# Patient Record
Sex: MALE | Race: WHITE
[De-identification: names, ages, dates, MRNs, and addresses within clinical notes are randomized per-mention and may not be internally consistent; named-entity substitution may affect disease eponyms.]

---

## 2019-08-19 ENCOUNTER — HOSPITAL ENCOUNTER (OUTPATIENT)
Dept: HOSPITAL 88 - OR | Age: 54
Setting detail: OBSERVATION
LOS: 1 days | Discharge: HOME | End: 2019-08-20
Attending: SPECIALIST | Admitting: SPECIALIST
Payer: COMMERCIAL

## 2019-08-19 VITALS — HEIGHT: 65 IN | WEIGHT: 227.25 LBS | BODY MASS INDEX: 37.86 KG/M2

## 2019-08-19 VITALS — DIASTOLIC BLOOD PRESSURE: 58 MMHG | SYSTOLIC BLOOD PRESSURE: 109 MMHG

## 2019-08-19 VITALS — DIASTOLIC BLOOD PRESSURE: 62 MMHG | SYSTOLIC BLOOD PRESSURE: 131 MMHG

## 2019-08-19 VITALS — SYSTOLIC BLOOD PRESSURE: 131 MMHG | DIASTOLIC BLOOD PRESSURE: 62 MMHG

## 2019-08-19 VITALS — SYSTOLIC BLOOD PRESSURE: 97 MMHG | DIASTOLIC BLOOD PRESSURE: 52 MMHG

## 2019-08-19 VITALS — DIASTOLIC BLOOD PRESSURE: 59 MMHG | SYSTOLIC BLOOD PRESSURE: 115 MMHG

## 2019-08-19 DIAGNOSIS — E78.5: ICD-10-CM

## 2019-08-19 DIAGNOSIS — M17.12: Primary | ICD-10-CM

## 2019-08-19 DIAGNOSIS — F17.200: ICD-10-CM

## 2019-08-19 DIAGNOSIS — D64.9: ICD-10-CM

## 2019-08-19 PROCEDURE — 97161 PT EVAL LOW COMPLEX 20 MIN: CPT

## 2019-08-19 PROCEDURE — 85014 HEMATOCRIT: CPT

## 2019-08-19 PROCEDURE — 36415 COLL VENOUS BLD VENIPUNCTURE: CPT

## 2019-08-19 PROCEDURE — 93005 ELECTROCARDIOGRAM TRACING: CPT

## 2019-08-19 PROCEDURE — 97116 GAIT TRAINING THERAPY: CPT

## 2019-08-19 PROCEDURE — 97530 THERAPEUTIC ACTIVITIES: CPT

## 2019-08-19 PROCEDURE — 27447 TOTAL KNEE ARTHROPLASTY: CPT

## 2019-08-19 PROCEDURE — 86850 RBC ANTIBODY SCREEN: CPT

## 2019-08-19 PROCEDURE — 73560 X-RAY EXAM OF KNEE 1 OR 2: CPT

## 2019-08-19 PROCEDURE — 86900 BLOOD TYPING SEROLOGIC ABO: CPT

## 2019-08-19 PROCEDURE — 85018 HEMOGLOBIN: CPT

## 2019-08-19 PROCEDURE — 86920 COMPATIBILITY TEST SPIN: CPT

## 2019-08-19 RX ADMIN — Medication SCH MG: at 17:23

## 2019-08-19 RX ADMIN — CEFAZOLIN SODIUM SCH MLS/HR: 1 SOLUTION INTRAVENOUS at 14:25

## 2019-08-19 RX ADMIN — SODIUM CHLORIDE SCH MLS/HR: 9 INJECTION, SOLUTION INTRAVENOUS at 14:00

## 2019-08-19 RX ADMIN — ACETAMINOPHEN SCH MG: 10 INJECTION, SOLUTION INTRAVENOUS at 12:00

## 2019-08-19 RX ADMIN — CEFAZOLIN SODIUM SCH MLS/HR: 1 SOLUTION INTRAVENOUS at 22:30

## 2019-08-19 RX ADMIN — ACETAMINOPHEN SCH MG: 10 INJECTION, SOLUTION INTRAVENOUS at 17:23

## 2019-08-19 NOTE — OPERATIVE REPORT
DATE OF PROCEDURE:  08/19/2019

 

SURGEON:  Alejandro Patricia MD

 

ASSISTANT:  Luis Fernando Jeter.

 

PREOPERATIVE DIAGNOSIS:  Osteoarthritis, left knee.

 

POSTOPERATIVE DIAGNOSIS:  Osteoarthritis, left knee.

 

PROCEDURE:  Left total knee arthroplasty.

 

INDICATIONS:  The patient is a 53-year-old gentleman, who complains of severe pain in

his left knee.  Clinic exam and x-rays are consistent with medial compartment

osteoarthritis.  The findings and options have been discussed.  The patient is highly

motivated to proceed with a left total knee replacement.  The risks and benefits have

been reviewed.  Alternatives of care have been discussed.  He states he understands and

wishes to proceed. 

 

PROCEDURE IN DETAIL:  The patient was brought to the operating room and placed under

general anesthetic.  His left lower extremity was prepped and draped in a sterile

manner.  He received prophylactic antibiotics, tranexamic acid, and a regional block in

the holding area.  A preoperative time-out was performed.  The extremity was

exsanguinated and a proximal tourniquet was inflated to 300 mmHg.  An anterior incision

with a medial parapatellar arthrotomy was performed.  Clear synovial fluid was removed

from the joint.  Soft tissue releases were performed to bring the knee up into flexion

with the patella everted.  The anterior cruciate ligament was sacrificed.  A [employer] knee system was used throughout the case.  Marginal osteophytes and

meniscal remnants were removed.  An extramedullary cutting guide was used to resect the

proximal tibia.  The tibial base plate was noted to be a size D.  The central fin punch

was drilled and impacted.  Attention was directed towards the distal femur.  An

intramedullary cutting guide was used to resect the distal femur in 5 degrees of valgus

and rotation referencing off a combination of landmarks including Whitesides line, the

epicondylar axis, and the posterior condyles.  The femoral component was a size #7.  The

anterior and posterior cuts were made.  Trial reductions were performed.  A 10 mm medial

congruent tibial insert provided appropriate soft tissue balancing in full extension and

90 degrees of flexion.  The patella was resurfaced with a 32 mm x 8.5 mm patellar

button.  The thickness was checked before and after and was right around 22 mm.

Patellar tracking was noted to be concentric.  The trial implants were then all removed.

 A 100 mL premixed pericapsular WARREN injection was placed into the surrounding soft

tissue.  The knee was then thoroughly irrigated with a shower tip pulsatile lavage.  All

bone cuts using an oscillating saw had been irrigated using a spray mixture of diluted

polymyxin and vancomycin spray.  The components were cemented into place using a single

mix of Palacos cement preloaded with antibiotics.  Care was taken to remove extravasated

cement.  The wound was further irrigated with the pulsatile lavage while the cement

cured.  A 500 mg of vancomycin powder was then placed into the joint.  The arthrotomy

was closed with interrupted #1 Ethibond.  The skin was closed with subcuticular Vicryl

and staples.  A sterile Aquacel bandage and an Ace wrap were applied.  The patient was 

then transported to the recovery room in stable condition.  Blood loss was minimal.  All

needle and sponge counts were correct. 

 

 

 

 

______________________________

Alejandro Patricia MD DR/VAL

D:  08/19/2019 10:57:11

T:  08/19/2019 12:47:34

Job #:  [id number]

## 2019-08-19 NOTE — DIAGNOSTIC IMAGING REPORT
Left knee, 2 views.





History: Postop.



Findings: Status post total left knee arthroplasty with prosthetic components

in anatomic alignment.  Overlying subcutaneous emphysema and surgical skin

staples are present.





IMPRESSION:

Status post left knee replacement in anatomic position.



Signed by: Daniel Delvalle on 8/19/2019 10:40 AM

## 2019-08-19 NOTE — NUR
RECEIVED PATIENT FROM RECOVERY. PATIENT A/O X3, EVEN RESPIRATIONS ON RA. BOWEL SOUNDS X4. 
LUNG SOUNDS CLEAR TO AUSCULTATION. LEFT HAND 20 GAUGE IV WITH NS @ 100 CC/HR. RIGHT TAMMIE HOSE 
IN PLACE. FOOT PUMPS BILATERALLY. NO PAIN AT THIS TIME. LEFT KNEE DRESSING CLEAN, DRY, AND 
INTACT. VITAL SIGNS STABLE. CALL LIGHT IN REACH WILL CONTINUE TO MONITOR PATIENT. none

## 2019-08-20 VITALS — SYSTOLIC BLOOD PRESSURE: 127 MMHG | DIASTOLIC BLOOD PRESSURE: 63 MMHG

## 2019-08-20 VITALS — DIASTOLIC BLOOD PRESSURE: 64 MMHG | SYSTOLIC BLOOD PRESSURE: 112 MMHG

## 2019-08-20 VITALS — DIASTOLIC BLOOD PRESSURE: 63 MMHG | SYSTOLIC BLOOD PRESSURE: 127 MMHG

## 2019-08-20 VITALS — SYSTOLIC BLOOD PRESSURE: 122 MMHG | DIASTOLIC BLOOD PRESSURE: 70 MMHG

## 2019-08-20 LAB
HCT VFR BLD AUTO: 39 % (ref 38.2–49.6)
HGB BLD-MCNC: 13.5 G/DL (ref 14–18)

## 2019-08-20 RX ADMIN — SODIUM CHLORIDE SCH MLS/HR: 9 INJECTION, SOLUTION INTRAVENOUS at 00:00

## 2019-08-20 RX ADMIN — ACETAMINOPHEN SCH MG: 10 INJECTION, SOLUTION INTRAVENOUS at 06:03

## 2019-08-20 RX ADMIN — ACETAMINOPHEN SCH MG: 10 INJECTION, SOLUTION INTRAVENOUS at 00:30

## 2019-08-20 RX ADMIN — CEFAZOLIN SODIUM SCH MLS/HR: 1 SOLUTION INTRAVENOUS at 06:18

## 2019-08-20 RX ADMIN — SODIUM CHLORIDE SCH MLS/HR: 9 INJECTION, SOLUTION INTRAVENOUS at 10:00

## 2019-08-20 RX ADMIN — Medication SCH MG: at 08:06

## 2019-08-20 NOTE — CONSULTATION
DATE OF CONSULTATION:    

 

REASON FOR CONSULTATION:  Postop medical management.

 

HISTORY OF PRESENT ILLNESS:  The patient is a 53-year-old gentleman, status post left

total knee arthroplasty for end-stage osteoarthritis, who is doing very well

postoperatively with minimal pain in the left knee and denies any chest pain, fever,

chills, headaches, shortness of breath, dizziness, nausea, vomiting, or fever chills on

review of systems. 

 

PAST MEDICAL HISTORY:  Significant for osteoarthritis.

 

PAST SURGICAL HISTORY:  Knee scope.

 

MEDICATIONS:  Anti-inflammatory.

 

ALLERGIES:  NONE.

 

SOCIAL HISTORY:  Non-.  Works as a .  He does smoke about one pack per

day for 37 years.  Nondrinker. 

 

FAMILY HISTORY:  Noncontributory.

 

PHYSICAL EXAMINATION:

VITAL SIGNS:  Temperature is 96, pulse 76, blood pressure 136/70, and sats 98% on room

air. 

GENERAL:  He is no apparent distress, lying in bed. 

NECK:  Supple. 

CARDIOVASCULAR:  Regular rate rhythm. 

LUNGS:  Clear to auscultation bilaterally. 

ABDOMEN:  Good bowel sounds.  Soft and nontender. 

EXTREMITIES:  No clubbing or cyanosis.  Left knee is bandaged with no seepage. 

NEUROLOGIC:  Nonfocal.

ASSESSMENT AND PLAN:  

1. Status post left knee arthroplasty.  Continue with postoperative care.  Left knee

pain, continue the current pain medicines. 

2. Anemia, check a CBC.

3. Tobacco abuse.  The patient was instructed to discontinue smoking.  Please see

hospital chart for full details. 

 

 

 

 

______________________________

MD VESTA Cruz/VAL

D:  08/20/2019 04:34:24

T:  08/20/2019 06:00:53

Job #:  [id number]

## 2019-08-20 NOTE — NUR
PATIENT A/O X3, EVEN RESPIRATIONS ON RA. LUNG SOUNDS CLEAR TO AUSCULTATION. BOWEL SOUNDS X4. 
LEFT KNEE DRESSING CLEAN, DRY, AND INTACT. TAMMIE HOSE AND FOOT PUMPS BILATERALLY. LEFT HAND 20 
GAUGE IV SL. PAIN 5/10 LEFT KNEE. PRN PAIN MEDICATION. VITAL SIGNS STABLE, CALL LIGHT IN 
REACH WILL CONTINUE TO MONITOR PATIENT.

## 2019-08-20 NOTE — NUR
PATIENT DISCHARGED FROM FACILITY. PATIENT GATHERED ALL PERSONAL BELONGINGS, DISCHARGE 
INSTRUCTIONS, AND FOLLOW UP INFORMATION. LEFT UNIT IN WHEEL CHAIR AND WENT HOME VIA PRIVATE 
AUTO. NO SIGNS OF DISTRESS WHEN LEAVING FACILITY.

## 2019-08-20 NOTE — NUR
RECEIVED PATIENT AWAKE RESTING IN BED. CPM IN PLACE, NO SIGNS OF DISTRESS. BED LOW, WHEELS 
LOCKED, SIDE RAILS X2, CALL LIGHT IN REACH WILL CONTINUE TO MONITOR PATIENT.

## 2019-08-20 NOTE — NUR
DR HANSON OFFICE PREARRANGED FOLLOWING DISCHARGE PLAN OF:

HOME HEALTH WITH HOME CARE PROVIDERS CONFIRMED WITH [phone] FOR SERVICES 5 
TIMES A WEEK FOR 2 WEEKS AND FOR FOLLOWING 2 WEEKS 3 WEEKS

DME 3 IN ONE COMMODE. AND ROLLING WALKER WITH WHEELS. PROVIDED BY [employer] 
[employer],[phone] DELIVERED HERE TO HOSPITAL AT 9AM.

## 2019-09-24 ENCOUNTER — HOSPITAL ENCOUNTER (OUTPATIENT)
Dept: HOSPITAL 88 - PT | Age: 54
LOS: 6 days | End: 2019-09-30
Attending: SPECIALIST
Payer: COMMERCIAL

## 2019-09-24 DIAGNOSIS — Z96.652: Primary | ICD-10-CM

## 2019-09-24 DIAGNOSIS — M62.81: ICD-10-CM

## 2019-09-24 DIAGNOSIS — M25.662: ICD-10-CM

## 2019-09-24 DIAGNOSIS — M25.562: ICD-10-CM

## 2019-09-24 DIAGNOSIS — Z47.1: ICD-10-CM

## 2019-10-08 ENCOUNTER — HOSPITAL ENCOUNTER (OUTPATIENT)
Dept: HOSPITAL 88 - PT | Age: 54
LOS: 23 days | End: 2019-10-31
Attending: SPECIALIST
Payer: COMMERCIAL

## 2019-10-08 DIAGNOSIS — Z47.1: ICD-10-CM

## 2019-10-08 DIAGNOSIS — M25.652: ICD-10-CM

## 2019-10-08 DIAGNOSIS — M62.81: ICD-10-CM

## 2019-10-08 DIAGNOSIS — M25.552: ICD-10-CM

## 2019-10-08 DIAGNOSIS — Z96.652: Primary | ICD-10-CM
